# Patient Record
Sex: FEMALE
[De-identification: names, ages, dates, MRNs, and addresses within clinical notes are randomized per-mention and may not be internally consistent; named-entity substitution may affect disease eponyms.]

---

## 2020-01-15 ENCOUNTER — APPOINTMENT (OUTPATIENT)
Dept: OPHTHALMOLOGY | Facility: CLINIC | Age: 25
End: 2020-01-15

## 2023-01-05 PROBLEM — Z00.00 ENCOUNTER FOR PREVENTIVE HEALTH EXAMINATION: Status: ACTIVE | Noted: 2023-01-05

## 2023-01-06 ENCOUNTER — APPOINTMENT (OUTPATIENT)
Dept: NEUROLOGY | Facility: CLINIC | Age: 28
End: 2023-01-06
Payer: MEDICAID

## 2023-01-06 VITALS
HEIGHT: 63 IN | HEART RATE: 84 BPM | DIASTOLIC BLOOD PRESSURE: 63 MMHG | BODY MASS INDEX: 24.8 KG/M2 | SYSTOLIC BLOOD PRESSURE: 97 MMHG | WEIGHT: 140 LBS

## 2023-01-06 DIAGNOSIS — G43.909 MIGRAINE, UNSPECIFIED, NOT INTRACTABLE, W/OUT STATUS MIGRAINOSUS: ICD-10-CM

## 2023-01-06 PROCEDURE — 99204 OFFICE O/P NEW MOD 45 MIN: CPT

## 2023-01-11 ENCOUNTER — NON-APPOINTMENT (OUTPATIENT)
Age: 28
End: 2023-01-11

## 2023-01-11 NOTE — ASSESSMENT
[FreeTextEntry1] : Discussed the benefits of stimulant medication and attention deficit disorder as well as potential adverse effects.  She will start methylphenidate 10 mg after breakfast and after lunch.  If she notes any adverse effects she will contact me.\par \par For her migraine headaches she will start coenzyme Q10 200 mg daily for migraine prophylaxis.  At onset of headache she can take 2 Aleve tablets.  Advised to keep a headache diary.\par \par She will schedule a telehealth follow-up in 1 month.

## 2023-01-11 NOTE — PHYSICAL EXAM
[FreeTextEntry1] : No cognitive or communication deficits. Cranial nerves intact. No sensorimotor deficits. Heart sounds are normal. No murmurs heard.

## 2023-01-11 NOTE — HISTORY OF PRESENT ILLNESS
[FreeTextEntry1] : 27-year-old woman provides history of being easily distractible since childhood.  She describes difficulty concentrating.  She also has a history of episodic headaches that are described as throbbing and associated with photophobia.\par \par No known family history of attention deficit disorders or migraine.\par \par She graduated [employer] and currently works as a  in a [employer] firm and planning to take the LSAT in 5 months.\par \par I had patient complete the Adult ADHD Self-Report Scale (ASR S-the 1.1) Symptom Checklist and her responses were highly consistent with a diagnosis of ADHD.  Copy of checklist to be scanned to our electronic health record.

## 2023-02-07 ENCOUNTER — APPOINTMENT (OUTPATIENT)
Dept: NEUROLOGY | Facility: CLINIC | Age: 28
End: 2023-02-07
Payer: MEDICAID

## 2023-02-07 ENCOUNTER — APPOINTMENT (OUTPATIENT)
Dept: GASTROENTEROLOGY | Facility: CLINIC | Age: 28
End: 2023-02-07

## 2023-02-07 DIAGNOSIS — F90.9 ATTENTION-DEFICIT HYPERACTIVITY DISORDER, UNSPECIFIED TYPE: ICD-10-CM

## 2023-02-07 PROCEDURE — 99441: CPT

## 2023-02-07 NOTE — REASON FOR VISIT
[Home] : at home, [unfilled] , at the time of the visit. [Medical Office: ([facility])___] : at the medical office located in  [Verbal consent obtained from patient] : the patient, [unfilled] [Follow-Up: _____] : a [unfilled] follow-up visit

## 2023-02-07 NOTE — HISTORY OF PRESENT ILLNESS
[FreeTextEntry1] : Patient was unable to connect to the telehealth site and visit conducted by telephone.  She misunderstood the directions and was taking two 20 mg tablets of methylphenidate twice daily and complained of headache.  She was told the prescription was for one 20 mg tablet twice daily.  Patient advised to take 1 tablet twice daily (before breakfast and before lunch) and call me back in 1 week regarding response.

## 2023-03-20 RX ORDER — METHYLPHENIDATE HYDROCHLORIDE 20 MG/1
20 TABLET ORAL TWICE DAILY
Qty: 60 | Refills: 0 | Status: ACTIVE | COMMUNITY
Start: 2023-01-06 | End: 1900-01-01

## 2023-11-01 ENCOUNTER — APPOINTMENT (OUTPATIENT)
Dept: NEUROLOGY | Facility: CLINIC | Age: 28
End: 2023-11-01

## 2024-08-15 ENCOUNTER — NON-APPOINTMENT (OUTPATIENT)
Age: 29
End: 2024-08-15

## 2025-03-27 ENCOUNTER — OUTPATIENT (OUTPATIENT)
Dept: OUTPATIENT SERVICES | Facility: HOSPITAL | Age: 30
LOS: 1 days | End: 2025-03-27

## 2025-03-27 ENCOUNTER — APPOINTMENT (OUTPATIENT)
Dept: PLASTIC SURGERY | Facility: CLINIC | Age: 30
End: 2025-03-27